# Patient Record
Sex: MALE | URBAN - METROPOLITAN AREA
[De-identification: names, ages, dates, MRNs, and addresses within clinical notes are randomized per-mention and may not be internally consistent; named-entity substitution may affect disease eponyms.]

---

## 2021-06-23 ENCOUNTER — OFFICE VISIT (OUTPATIENT)
Dept: URBAN - METROPOLITAN AREA CLINIC 63 | Facility: CLINIC | Age: 51
End: 2021-06-23
Payer: COMMERCIAL

## 2021-06-23 DIAGNOSIS — H52.13 MYOPIA, BILATERAL: ICD-10-CM

## 2021-06-23 DIAGNOSIS — H53.19 OTHER SUBJECTIVE VISUAL DISTURBANCES: Primary | ICD-10-CM

## 2021-06-23 DIAGNOSIS — H52.223 REGULAR ASTIGMATISM, BILATERAL: ICD-10-CM

## 2021-06-23 PROCEDURE — 92004 COMPRE OPH EXAM NEW PT 1/>: CPT | Performed by: OPTOMETRIST

## 2021-06-23 ASSESSMENT — INTRAOCULAR PRESSURE
OD: 16
OS: 15

## 2021-06-23 NOTE — IMPRESSION/PLAN
Impression: Other subjective visual disturbances: H53.19. Plan: No pathology seen on DFE.  Will have pt RTC for HVF 30-2

## 2021-06-30 ENCOUNTER — TESTING ONLY (OUTPATIENT)
Dept: URBAN - METROPOLITAN AREA CLINIC 63 | Facility: CLINIC | Age: 51
End: 2021-06-30
Payer: COMMERCIAL

## 2021-06-30 PROCEDURE — 92083 EXTENDED VISUAL FIELD XM: CPT | Performed by: OPTOMETRIST

## 2021-06-30 NOTE — IMPRESSION/PLAN
Impression: Other subjective visual disturbances: H53.19. Plan: H/o TBI with no visual field defects OU, no pathology seen on previous DFE. Continue with current specs. Pt to f/u with PCP re:TBI, possible referral to neuro or OT for further eval/mgmt.

## 2022-09-26 ENCOUNTER — OFFICE VISIT (OUTPATIENT)
Dept: URBAN - METROPOLITAN AREA CLINIC 63 | Facility: CLINIC | Age: 52
End: 2022-09-26
Payer: COMMERCIAL

## 2022-09-26 DIAGNOSIS — H52.13 MYOPIA, BILATERAL: ICD-10-CM

## 2022-09-26 DIAGNOSIS — H25.13 BILATERAL NUCLEAR SCLEROSIS CATARACT: Primary | ICD-10-CM

## 2022-09-26 PROCEDURE — 92014 COMPRE OPH EXAM EST PT 1/>: CPT | Performed by: OPTOMETRIST

## 2022-09-26 ASSESSMENT — INTRAOCULAR PRESSURE
OD: 13
OS: 12

## 2022-09-26 ASSESSMENT — KERATOMETRY
OD: 43.13
OS: 44.00

## 2022-09-26 ASSESSMENT — VISUAL ACUITY
OS: 20/20
OD: 20/20

## 2022-09-26 NOTE — IMPRESSION/PLAN
Impression: Myopia, bilateral: H52.13. Plan: Gave Rx for specs with +2.25 ADD and instructed on adaptation period, wear time.

## 2023-03-23 ENCOUNTER — OFFICE VISIT (OUTPATIENT)
Dept: URBAN - METROPOLITAN AREA CLINIC 63 | Facility: CLINIC | Age: 53
End: 2023-03-23
Payer: COMMERCIAL

## 2023-03-23 DIAGNOSIS — H25.13 AGE-RELATED NUCLEAR CATARACT, BILATERAL: ICD-10-CM

## 2023-03-23 DIAGNOSIS — H02.889 MEIBOMIAN GLAND DYSFUNCTION OF EYE: Primary | ICD-10-CM

## 2023-03-23 DIAGNOSIS — H04.123 DRY EYE SYNDROME OF BILATERAL LACRIMAL GLANDS: ICD-10-CM

## 2023-03-23 PROCEDURE — 92012 INTRM OPH EXAM EST PATIENT: CPT | Performed by: OPTOMETRIST

## 2023-03-23 ASSESSMENT — VISUAL ACUITY
OD: 20/20
OS: 20/20

## 2023-03-23 ASSESSMENT — INTRAOCULAR PRESSURE
OS: 13
OD: 13

## 2023-03-23 ASSESSMENT — KERATOMETRY
OS: 44.13
OD: 43.13

## 2023-03-23 NOTE — IMPRESSION/PLAN
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 Plan: Pt's dry eye symptoms in part due to meibomian gland dysfunction and evaporative dry eye. Recommend warm compresses (i.e. Kary Mask) 10min OU with lid massage OU . Pt ed on long-term treatment for improvement of symptoms.

## 2024-04-12 ENCOUNTER — OFFICE VISIT (OUTPATIENT)
Dept: URBAN - METROPOLITAN AREA CLINIC 63 | Facility: CLINIC | Age: 54
End: 2024-04-12
Payer: COMMERCIAL

## 2024-04-12 DIAGNOSIS — H18.713 CORNEAL ECTASIA, BILATERAL: ICD-10-CM

## 2024-04-12 DIAGNOSIS — H25.813 COMBINED FORMS OF AGE-RELATED CATARACT, BILATERAL: Primary | ICD-10-CM

## 2024-04-12 PROCEDURE — 92025 CPTRIZED CORNEAL TOPOGRAPHY: CPT | Performed by: OPHTHALMOLOGY

## 2024-04-12 PROCEDURE — 99204 OFFICE O/P NEW MOD 45 MIN: CPT | Performed by: OPHTHALMOLOGY

## 2024-04-12 ASSESSMENT — INTRAOCULAR PRESSURE
OS: 16
OD: 16

## 2024-04-12 ASSESSMENT — VISUAL ACUITY
OD: 20/20
OS: 20/20

## 2024-04-12 ASSESSMENT — KERATOMETRY
OS: 43.38
OD: 43.00

## 2025-06-26 ENCOUNTER — OFFICE VISIT (OUTPATIENT)
Dept: URBAN - METROPOLITAN AREA CLINIC 63 | Facility: CLINIC | Age: 55
End: 2025-06-26
Payer: COMMERCIAL

## 2025-06-26 DIAGNOSIS — H52.13 MYOPIA, BILATERAL: ICD-10-CM

## 2025-06-26 DIAGNOSIS — H18.713 CORNEAL ECTASIA, BILATERAL: Primary | ICD-10-CM

## 2025-06-26 DIAGNOSIS — H25.813 COMBINED FORMS OF AGE-RELATED CATARACT, BILATERAL: ICD-10-CM

## 2025-06-26 DIAGNOSIS — H04.123 DRY EYE SYNDROME OF BILATERAL LACRIMAL GLANDS: ICD-10-CM

## 2025-06-26 PROCEDURE — 92014 COMPRE OPH EXAM EST PT 1/>: CPT | Performed by: OPTOMETRIST

## 2025-06-26 ASSESSMENT — INTRAOCULAR PRESSURE
OS: 17
OD: 16

## 2025-06-26 ASSESSMENT — VISUAL ACUITY
OD: 20/20
OS: 20/25